# Patient Record
Sex: MALE | Race: ASIAN | URBAN - METROPOLITAN AREA
[De-identification: names, ages, dates, MRNs, and addresses within clinical notes are randomized per-mention and may not be internally consistent; named-entity substitution may affect disease eponyms.]

---

## 2019-02-27 ENCOUNTER — APPOINTMENT (RX ONLY)
Dept: URBAN - METROPOLITAN AREA CLINIC 53 | Facility: CLINIC | Age: 29
Setting detail: DERMATOLOGY
End: 2019-02-27

## 2019-02-27 DIAGNOSIS — L91.8 OTHER HYPERTROPHIC DISORDERS OF THE SKIN: ICD-10-CM

## 2019-02-27 DIAGNOSIS — L82.1 OTHER SEBORRHEIC KERATOSIS: ICD-10-CM

## 2019-02-27 DIAGNOSIS — L20.9 ATOPIC DERMATITIS, UNSPECIFIED: ICD-10-CM

## 2019-02-27 PROBLEM — L20.84 INTRINSIC (ALLERGIC) ECZEMA: Status: ACTIVE | Noted: 2019-02-27

## 2019-02-27 PROCEDURE — ? PRESCRIPTION SAMPLES PROVIDED

## 2019-02-27 PROCEDURE — 99202 OFFICE O/P NEW SF 15 MIN: CPT

## 2019-02-27 PROCEDURE — ? COUNSELING

## 2019-02-27 PROCEDURE — ? PRESCRIPTION

## 2019-02-27 PROCEDURE — ? TREATMENT REGIMEN

## 2019-02-27 RX ORDER — CRISABOROLE 20 MG/G
OINTMENT TOPICAL
Qty: 1 | Refills: 3 | Status: ERX | COMMUNITY
Start: 2019-02-27

## 2019-02-27 RX ADMIN — CRISABOROLE: 20 OINTMENT TOPICAL at 19:02

## 2019-02-27 ASSESSMENT — LOCATION SIMPLE DESCRIPTION DERM
LOCATION SIMPLE: CHEST
LOCATION SIMPLE: RIGHT CHEEK
LOCATION SIMPLE: RIGHT ZYGOMA
LOCATION SIMPLE: LEFT UPPER BACK
LOCATION SIMPLE: RIGHT UPPER ARM
LOCATION SIMPLE: LEFT CHEEK
LOCATION SIMPLE: RIGHT TEMPLE

## 2019-02-27 ASSESSMENT — LOCATION ZONE DERM
LOCATION ZONE: FACE
LOCATION ZONE: TRUNK
LOCATION ZONE: ARM

## 2019-02-27 ASSESSMENT — LOCATION DETAILED DESCRIPTION DERM
LOCATION DETAILED: LEFT MEDIAL UPPER BACK
LOCATION DETAILED: RIGHT SUPERIOR LATERAL MALAR CHEEK
LOCATION DETAILED: RIGHT ANTERIOR PROXIMAL UPPER ARM
LOCATION DETAILED: RIGHT CENTRAL TEMPLE
LOCATION DETAILED: LEFT CENTRAL MALAR CHEEK
LOCATION DETAILED: RIGHT CENTRAL MALAR CHEEK
LOCATION DETAILED: LEFT SUPERIOR CENTRAL MALAR CHEEK
LOCATION DETAILED: LEFT MEDIAL SUPERIOR CHEST
LOCATION DETAILED: RIGHT MEDIAL ZYGOMA

## 2019-02-27 NOTE — PROCEDURE: TREATMENT REGIMEN
Detail Level: Zone
Plan: Desiccation in the near future with Celacyn post treatment (samples given to patient)
Show Vanicream Line: Yes
Action 3: Continue
Other Instructions: Start Claritin or Zyrtec 10 mg PO daily\\nIf no improvement, to consider biopsy

## 2019-02-28 ENCOUNTER — APPOINTMENT (RX ONLY)
Dept: URBAN - METROPOLITAN AREA CLINIC 53 | Facility: CLINIC | Age: 29
Setting detail: DERMATOLOGY
End: 2019-02-28

## 2019-02-28 DIAGNOSIS — L20.89 OTHER ATOPIC DERMATITIS: ICD-10-CM

## 2019-02-28 PROCEDURE — ? PRESCRIPTION

## 2019-02-28 RX ORDER — BETAMETHASONE DIPROPIONATE 0.5 MG/G
CREAM TOPICAL BID
Qty: 1 | Refills: 2 | Status: ERX | COMMUNITY
Start: 2019-02-28

## 2019-02-28 RX ORDER — CRISABOROLE 20 MG/G
OINTMENT TOPICAL
Qty: 1 | Refills: 4 | Status: ERX

## 2019-02-28 RX ADMIN — BETAMETHASONE DIPROPIONATE: 0.5 CREAM TOPICAL at 21:50

## 2019-03-20 ENCOUNTER — APPOINTMENT (RX ONLY)
Dept: URBAN - METROPOLITAN AREA CLINIC 53 | Facility: CLINIC | Age: 29
Setting detail: DERMATOLOGY
End: 2019-03-20

## 2019-03-20 DIAGNOSIS — L82.1 OTHER SEBORRHEIC KERATOSIS: ICD-10-CM

## 2019-03-20 DIAGNOSIS — L20.9 ATOPIC DERMATITIS, UNSPECIFIED: ICD-10-CM | Status: WELL CONTROLLED

## 2019-03-20 PROCEDURE — ? PRESCRIPTION SAMPLES PROVIDED

## 2019-03-20 PROCEDURE — ? BENIGN DESTRUCTION COSMETIC

## 2019-03-20 PROCEDURE — ? ADDITIONAL NOTES

## 2019-03-20 PROCEDURE — ? COUNSELING

## 2019-03-20 ASSESSMENT — LOCATION SIMPLE DESCRIPTION DERM
LOCATION SIMPLE: RIGHT CHEEK
LOCATION SIMPLE: LEFT CHEEK
LOCATION SIMPLE: LEFT EYEBROW
LOCATION SIMPLE: RIGHT TEMPLE
LOCATION SIMPLE: LEFT TEMPLE
LOCATION SIMPLE: LEFT NOSE

## 2019-03-20 ASSESSMENT — LOCATION DETAILED DESCRIPTION DERM
LOCATION DETAILED: LEFT CENTRAL EYEBROW
LOCATION DETAILED: RIGHT SUPERIOR LATERAL MALAR CHEEK
LOCATION DETAILED: LEFT SUPERIOR LATERAL MALAR CHEEK
LOCATION DETAILED: RIGHT MID TEMPLE
LOCATION DETAILED: RIGHT MEDIAL MALAR CHEEK
LOCATION DETAILED: LEFT CENTRAL MALAR CHEEK
LOCATION DETAILED: LEFT SUPERIOR CENTRAL MALAR CHEEK
LOCATION DETAILED: RIGHT INFERIOR TEMPLE
LOCATION DETAILED: RIGHT LATERAL MALAR CHEEK
LOCATION DETAILED: RIGHT CENTRAL MALAR CHEEK
LOCATION DETAILED: RIGHT SUPERIOR CENTRAL MALAR CHEEK
LOCATION DETAILED: LEFT INFERIOR TEMPLE
LOCATION DETAILED: LEFT NASAL SIDEWALL

## 2019-03-20 ASSESSMENT — LOCATION ZONE DERM
LOCATION ZONE: NOSE
LOCATION ZONE: FACE

## 2019-03-20 NOTE — PROCEDURE: ADDITIONAL NOTES
Additional Notes: Patient will try filling Eucrisa 2% ointment with manufacturers rebate card at [facility]. If not covered, will send to [facility]. Additional Notes: Patient will try filling Eucrisa 2% ointment with manufacturers rebate card at [facility]. If not covered, will send to [facility].

## 2019-03-20 NOTE — PROCEDURE: BENIGN DESTRUCTION COSMETIC
Post-Care Instructions: I reviewed with the patient in detail post-care instructions. Patient is to wear sunprotection, and avoid picking at any of the treated lesions. Pt may apply Vaseline to crusted or scabbing areas. Celacyn scar gel applied daily.
Anesthesia Volume In Cc: 0.5
Consent: The patient's consent was obtained including but not limited to risks of crusting, scabbing, blistering, scarring, darker or lighter pigmentary change, recurrence, incomplete removal and infection.
Price (Use Numbers Only, No Special Characters Or $): 50
Detail Level: Zone